# Patient Record
Sex: MALE | Race: BLACK OR AFRICAN AMERICAN | NOT HISPANIC OR LATINO | URBAN - METROPOLITAN AREA
[De-identification: names, ages, dates, MRNs, and addresses within clinical notes are randomized per-mention and may not be internally consistent; named-entity substitution may affect disease eponyms.]

---

## 2018-07-01 ENCOUNTER — TELEPHONE (OUTPATIENT)
Dept: SPORTS MEDICINE | Facility: CLINIC | Age: 23
End: 2018-07-01

## 2018-07-01 DIAGNOSIS — Z02.5 ROUTINE SPORTS PHYSICAL EXAM: Primary | ICD-10-CM

## 2018-07-02 ENCOUNTER — OFFICE VISIT (OUTPATIENT)
Dept: SPORTS MEDICINE | Facility: CLINIC | Age: 23
End: 2018-07-02

## 2018-07-02 DIAGNOSIS — Z02.5 ROUTINE SPORTS PHYSICAL EXAM: ICD-10-CM

## 2018-07-02 PROCEDURE — 93005 ELECTROCARDIOGRAM TRACING: CPT | Mod: PBBFAC | Performed by: INTERNAL MEDICINE

## 2018-07-02 PROCEDURE — 99203 OFFICE O/P NEW LOW 30 MIN: CPT | Mod: S$PBB,,, | Performed by: ORTHOPAEDIC SURGERY

## 2018-07-02 PROCEDURE — 93010 ELECTROCARDIOGRAM REPORT: CPT | Mod: S$PBB,,, | Performed by: INTERNAL MEDICINE

## 2018-07-02 NOTE — PROGRESS NOTES
C.C. Pre-particpation physical    23 y.o. year-old [place]       RECENT HISTORY:   1. No current or recent symptoms     Orthopaedic history:   R olecranon bursectomy 2012 ([facility])    PHYSICAL EXAM:      GEN: Alert and oriented x3. In no apparent distress.     Well-developed, well-nourished male. Observation of ears, eyes, and nose   reveal no gross abnormalities.  See ophthal report for full eye exam    CERVICAL SPINE: Full range of motion with no deficits.     BILATERAL SHOULDER EXAM: shows full symmetric range of motion with 5/5   strength throughout the supraspinatus and infraspinatus, deltoid, and   subscapularis. No evidence of instability.     BILATERAL ELBOW EXAM: Shows full and symmetric extension/flexion, and   pronation/supination, and varus/valgus stability. Negative posterolateral   rotatory instability. +scar at R olecranon, NTTP    BILATERAL WRIST EXAM: Shows normal and symmetric full flexion/extension   and radial/ulnar deviation.     HAND EXAM: Shows full range of motion to bilateral hands and full   strength and stability to all fingers.     LUMBAR SPINE EXAM: Shows no evidence of any scoliosis. He has full   flexion and extension with no pain and no apparent tenderness to the   spine. Negative straight leg raise bilaterally.     HIP EXAM: Shows full range of motion without pain or signs of impingement.   Symmetric internal rotation without pain.  Has 5/5 hip flexion strength and 5/5 strength testing to the entire lower extremity. L: Flexion 125, IR c 15, s 20, ER 40, R: Flexion 125, IR c 10, s 15, ER 50    KNEE EXAM: Examination of bilateral knees shows symmetric range of motion   0 to 145 degrees with negative Lachman and stable to varus-valgus stress   testing. No joint line tenderness. Good quad tone. No effusion of either knee. B popliteal angle 10    Bilateral legs: NTTP over tibiae mid shaft or distal.      FOOT AND ANKLE EXAM: Shows good range of motion to bilateral ankles  with   no neurologic deficit. There is a 5/5 strength exam throughout the foot   and ankle exam. There is a normal arch on both feet. There is no tenderness to palpation along either foot. - turf toe exam      IMAGING:   None today      ASSESSMENT:   Orthopedically cleared for participation for the [employer] [employer] summer league 2018

## 2018-07-03 ENCOUNTER — OFFICE VISIT (OUTPATIENT)
Dept: INTERNAL MEDICINE | Facility: CLINIC | Age: 23
End: 2018-07-03
Attending: FAMILY MEDICINE

## 2018-07-03 VITALS
WEIGHT: 225 LBS | DIASTOLIC BLOOD PRESSURE: 64 MMHG | BODY MASS INDEX: 26.57 KG/M2 | SYSTOLIC BLOOD PRESSURE: 118 MMHG | HEIGHT: 77 IN | HEART RATE: 47 BPM

## 2018-07-03 DIAGNOSIS — Z00.00 ANNUAL PHYSICAL EXAM: Primary | ICD-10-CM

## 2018-07-03 DIAGNOSIS — R01.1 MURMUR, CARDIAC: ICD-10-CM

## 2018-07-03 PROCEDURE — 99999 PR PBB SHADOW E&M-EST. PATIENT-LVL II: CPT | Mod: PBBFAC,,, | Performed by: FAMILY MEDICINE

## 2018-07-03 PROCEDURE — 99212 OFFICE O/P EST SF 10 MIN: CPT | Mod: PBBFAC | Performed by: FAMILY MEDICINE

## 2018-07-03 PROCEDURE — 99385 PREV VISIT NEW AGE 18-39: CPT | Mod: S$PBB,,, | Performed by: FAMILY MEDICINE

## 2018-07-03 NOTE — PROGRESS NOTES
Subjective:       Patient ID: Bruna Navarro is a 23 y.o. male.    Chief Complaint: No chief complaint on file.    New patient for an annual wellness check/physical exam. No specific complaints, please see ROS. Preseason [facility]ADE clearance physical. 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        Review of Systems   Constitutional: Negative for chills, fatigue, fever and unexpected weight change.   HENT: Negative for congestion and trouble swallowing.    Eyes: Negative for redness and visual disturbance.   Respiratory: Negative for cough, chest tightness and shortness of breath.    Cardiovascular: Negative for chest pain, palpitations and leg swelling.   Gastrointestinal: Negative for abdominal pain and blood in stool.   Genitourinary: Negative for difficulty urinating and hematuria.   Musculoskeletal: Negative for arthralgias, back pain, gait problem, joint swelling, myalgias and neck pain.   Skin: Negative for color change and rash.   Neurological: Negative for tremors, speech difficulty, weakness, numbness and headaches.   Hematological: Negative for adenopathy. Does not bruise/bleed easily.   Psychiatric/Behavioral: Negative for behavioral problems, confusion and sleep disturbance. The patient is not nervous/anxious.        Objective:      Physical Exam   Constitutional: He is oriented to person, place, and time. He appears well-developed and well-nourished.   HENT:   Head: Normocephalic and atraumatic.   Eyes: Conjunctivae are normal. No scleral icterus.   Neck: Normal range of motion. Neck supple.   Cardiovascular: Normal rate, regular rhythm and intact distal pulses.  Exam reveals no gallop and no friction rub.    Murmur heard.   Systolic murmur is present with a grade of 2/6   Pulmonary/Chest: Effort normal and breath sounds normal. No respiratory distress. He has no wheezes. He has no rales.   Abdominal: He exhibits no distension. There is no tenderness.   Musculoskeletal: He exhibits no  edema or deformity.   Neurological: He is alert and oriented to person, place, and time. He displays no tremor. No cranial nerve deficit. Coordination and gait normal.   Skin: Skin is warm and dry. No rash noted. He is not diaphoretic. No erythema.   Psychiatric: He has a normal mood and affect. His behavior is normal. Judgment and thought content normal.   Nursing note and vitals reviewed.      Assessment:       1. Annual physical exam    2. Murmur, cardiac        Plan:   Diagnoses and all orders for this visit:    Annual physical exam    Murmur, cardiac  -     Exercise stress echo with color flow; Future      See meds, orders, follow up, routing and instructions sections of encounter.  This is a new patient for [facility] summer league clearance.  The patient completed   [facility] medical history questionnaire, which will be included in his medical record.    Of specific note answer 25, states he had a cardiac murmur, which he states   was evaluated in [place] and [place].  No records are available.  Family history   of maternal grandfather with a pacemaker, elderly.    The patient had a concussion which he states was in 2016.  He missed two weeks   of play.  He states this was when he was at [facility], was knocked   out, released without any other concussions noted.  He had a separate issue of a   nasal fracture in [place] in 2017, no records are available.  He has no ocular or   other complaints at this time.  He has no cardiopulmonary complaints at this   time.    RECOMMENDATIONS:  His outside records are unavailable.  I suggested an [facility]   protocol stress echocardiogram for full clearance.      NARAYAN  dd: 07/03/2018 11:53:21 (CDT)  td: 07/04/2018 08:47:24 (CDT)  Doc ID   #[id number]  Job ID #[id number]    CC:     [id number]

## 2018-07-05 ENCOUNTER — CLINICAL SUPPORT (OUTPATIENT)
Dept: CARDIOLOGY | Facility: CLINIC | Age: 23
End: 2018-07-05
Attending: FAMILY MEDICINE

## 2018-07-05 ENCOUNTER — TELEPHONE (OUTPATIENT)
Dept: INTERNAL MEDICINE | Facility: CLINIC | Age: 23
End: 2018-07-05

## 2018-07-05 DIAGNOSIS — R01.1 MURMUR, CARDIAC: ICD-10-CM

## 2018-07-05 LAB
AORTIC VALVE REGURGITATION: NORMAL
DIASTOLIC DYSFUNCTION: NO
ESTIMATED PA SYSTOLIC PRESSURE: 26.32
MITRAL VALVE REGURGITATION: NORMAL
RETIRED EF AND QEF - SEE NOTES: 58 (ref 55–65)
TRICUSPID VALVE REGURGITATION: NORMAL

## 2018-07-05 PROCEDURE — 93325 DOPPLER ECHO COLOR FLOW MAPG: CPT | Mod: PBBFAC,PO | Performed by: INTERNAL MEDICINE

## 2018-07-05 PROCEDURE — 93320 DOPPLER ECHO COMPLETE: CPT | Mod: 26,S$PBB,, | Performed by: INTERNAL MEDICINE

## 2018-07-05 PROCEDURE — 0399T EXERCISE STRESS ECHO WITH COLOR FLOW: CPT | Mod: 26,S$PBB,, | Performed by: INTERNAL MEDICINE

## 2018-07-05 PROCEDURE — 93351 STRESS TTE COMPLETE: CPT | Mod: 26,S$PBB,, | Performed by: INTERNAL MEDICINE
